# Patient Record
Sex: FEMALE | Race: BLACK OR AFRICAN AMERICAN | NOT HISPANIC OR LATINO | URBAN - METROPOLITAN AREA
[De-identification: names, ages, dates, MRNs, and addresses within clinical notes are randomized per-mention and may not be internally consistent; named-entity substitution may affect disease eponyms.]

---
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
## 2023-08-08 NOTE — ED PROVIDER NOTE - CPE EDP NEURO NORM
"With cough/sneeze/laugh/yell/jump:   - close your box  - your box is your: core, pelvic floor, diaphragm, and back   - practice quick closes with clearing your throat and progress to coughing - can practice laying down or sitting/standing  - place your hands over your belly - I want you to feel the force pulling inward, not pushing outward     [email or web address] "Mummy tummy"       Plyometrics: quick explosive movements   "
normal...